# Patient Record
Sex: FEMALE | Employment: UNEMPLOYED | URBAN - NONMETROPOLITAN AREA
[De-identification: names, ages, dates, MRNs, and addresses within clinical notes are randomized per-mention and may not be internally consistent; named-entity substitution may affect disease eponyms.]

---

## 2023-10-23 DIAGNOSIS — E78.2 MIXED HYPERLIPIDEMIA: ICD-10-CM

## 2023-10-24 PROBLEM — G47.30 SLEEP APNEA IN ADULT: Status: ACTIVE | Noted: 2023-10-24

## 2023-10-24 PROBLEM — D64.9 ANEMIA: Status: ACTIVE | Noted: 2023-10-24

## 2023-10-24 PROBLEM — I47.29 NSVT (NONSUSTAINED VENTRICULAR TACHYCARDIA) (MULTI): Status: ACTIVE | Noted: 2023-10-24

## 2023-10-24 PROBLEM — I10 BENIGN ESSENTIAL HYPERTENSION: Status: ACTIVE | Noted: 2023-10-24

## 2023-10-24 PROBLEM — E11.9 DIABETES (MULTI): Status: ACTIVE | Noted: 2023-10-24

## 2023-10-24 PROBLEM — R00.2 PALPITATIONS: Status: ACTIVE | Noted: 2023-10-24

## 2023-10-24 PROBLEM — E78.5 HLD (HYPERLIPIDEMIA): Status: ACTIVE | Noted: 2023-10-24

## 2023-10-24 PROBLEM — R07.9 CHEST PAIN: Status: ACTIVE | Noted: 2023-10-24

## 2023-10-24 RX ORDER — ATORVASTATIN CALCIUM 20 MG/1
1 TABLET, FILM COATED ORAL NIGHTLY
COMMUNITY
End: 2023-10-30 | Stop reason: SDUPTHER

## 2023-10-24 RX ORDER — SPIRONOLACTONE 50 MG/1
1 TABLET, FILM COATED ORAL DAILY
COMMUNITY
End: 2023-10-30 | Stop reason: SDUPTHER

## 2023-10-24 RX ORDER — DULAGLUTIDE 0.75 MG/.5ML
0.75 INJECTION, SOLUTION SUBCUTANEOUS
COMMUNITY

## 2023-10-24 RX ORDER — ASPIRIN 81 MG/1
81 TABLET ORAL DAILY
COMMUNITY

## 2023-10-24 RX ORDER — FLUTICASONE PROPIONATE 50 MCG
2 SPRAY, SUSPENSION (ML) NASAL DAILY
COMMUNITY

## 2023-10-24 RX ORDER — ATORVASTATIN CALCIUM 20 MG/1
20 TABLET, FILM COATED ORAL NIGHTLY
Qty: 90 TABLET | Refills: 3 | OUTPATIENT
Start: 2023-10-24

## 2023-10-24 RX ORDER — IRBESARTAN 150 MG/1
1 TABLET ORAL DAILY
COMMUNITY
End: 2023-10-30 | Stop reason: SDUPTHER

## 2023-10-24 RX ORDER — CARVEDILOL 25 MG/1
1.5 TABLET ORAL 2 TIMES DAILY
COMMUNITY
End: 2023-10-30 | Stop reason: SDUPTHER

## 2023-10-24 RX ORDER — CHLORTHALIDONE 50 MG/1
1 TABLET ORAL DAILY
COMMUNITY

## 2023-10-30 ENCOUNTER — OFFICE VISIT (OUTPATIENT)
Dept: CARDIOLOGY | Facility: CLINIC | Age: 48
End: 2023-10-30
Payer: COMMERCIAL

## 2023-10-30 VITALS
WEIGHT: 293 LBS | BODY MASS INDEX: 47.09 KG/M2 | DIASTOLIC BLOOD PRESSURE: 74 MMHG | HEART RATE: 68 BPM | HEIGHT: 66 IN | SYSTOLIC BLOOD PRESSURE: 118 MMHG

## 2023-10-30 DIAGNOSIS — I10 BENIGN ESSENTIAL HYPERTENSION: Primary | ICD-10-CM

## 2023-10-30 DIAGNOSIS — E78.2 MIXED HYPERLIPIDEMIA: ICD-10-CM

## 2023-10-30 DIAGNOSIS — E11.9 TYPE 2 DIABETES MELLITUS WITHOUT COMPLICATION, WITHOUT LONG-TERM CURRENT USE OF INSULIN (MULTI): ICD-10-CM

## 2023-10-30 DIAGNOSIS — G47.30 SLEEP APNEA IN ADULT: ICD-10-CM

## 2023-10-30 PROCEDURE — 3078F DIAST BP <80 MM HG: CPT | Performed by: NURSE PRACTITIONER

## 2023-10-30 PROCEDURE — 3008F BODY MASS INDEX DOCD: CPT | Performed by: NURSE PRACTITIONER

## 2023-10-30 PROCEDURE — 4010F ACE/ARB THERAPY RXD/TAKEN: CPT | Performed by: NURSE PRACTITIONER

## 2023-10-30 PROCEDURE — 99213 OFFICE O/P EST LOW 20 MIN: CPT | Performed by: NURSE PRACTITIONER

## 2023-10-30 PROCEDURE — 1036F TOBACCO NON-USER: CPT | Performed by: NURSE PRACTITIONER

## 2023-10-30 PROCEDURE — 3074F SYST BP LT 130 MM HG: CPT | Performed by: NURSE PRACTITIONER

## 2023-10-30 RX ORDER — IBUPROFEN 800 MG/1
800 TABLET ORAL 3 TIMES DAILY
COMMUNITY
Start: 2022-11-08

## 2023-10-30 RX ORDER — ALBUTEROL SULFATE 0.83 MG/ML
2.5 SOLUTION RESPIRATORY (INHALATION) EVERY 6 HOURS
COMMUNITY

## 2023-10-30 RX ORDER — ALBUTEROL SULFATE 90 UG/1
1 AEROSOL, METERED RESPIRATORY (INHALATION) EVERY 4 HOURS PRN
COMMUNITY
Start: 2023-01-25

## 2023-10-30 RX ORDER — SPIRONOLACTONE 50 MG/1
50 TABLET, FILM COATED ORAL DAILY
Qty: 90 TABLET | Refills: 3 | Status: SHIPPED | OUTPATIENT
Start: 2023-10-30 | End: 2024-10-29

## 2023-10-30 RX ORDER — ATORVASTATIN CALCIUM 20 MG/1
20 TABLET, FILM COATED ORAL NIGHTLY
Qty: 90 TABLET | Refills: 3 | Status: SHIPPED | OUTPATIENT
Start: 2023-10-30 | End: 2023-11-07

## 2023-10-30 RX ORDER — IRBESARTAN 150 MG/1
150 TABLET ORAL DAILY
Qty: 90 TABLET | Refills: 3 | Status: SHIPPED | OUTPATIENT
Start: 2023-10-30 | End: 2024-10-29

## 2023-10-30 RX ORDER — CARVEDILOL 25 MG/1
37.5 TABLET ORAL 2 TIMES DAILY
Qty: 270 TABLET | Refills: 3 | Status: SHIPPED | OUTPATIENT
Start: 2023-10-30 | End: 2024-10-29

## 2023-10-30 RX ORDER — ACETAMINOPHEN 500 MG
1000 TABLET ORAL EVERY 6 HOURS PRN
COMMUNITY
Start: 2023-05-05

## 2023-10-30 RX ORDER — CETIRIZINE HYDROCHLORIDE 10 MG/1
10 TABLET ORAL 2 TIMES DAILY
COMMUNITY
Start: 2023-01-25

## 2023-10-30 NOTE — PATIENT INSTRUCTIONS
Please bring all medicines, vitamins, and herbal supplements with you when you come to the office.    Prescriptions will not be filled unless you are compliant with your follow up appointments or have a follow up appointment scheduled as per instruction of your physician. Refills should be requested at the time of your visit.    PLAN:   Through informed decision making process incorporating patients unique circumstances, the following treatment plan will be initiated:    1.  Prescription drug management of cardiovascular medication for efficacy, adherence to treatment, side effect assessment and polypharmacy. Current treatment clinically warranted and to continue without modifications.    2.  Annual labs (lipids, chem6)    3. Return for follow-up; in the interim, contact the office if new symptoms arise.  NP annual

## 2023-10-31 ASSESSMENT — ENCOUNTER SYMPTOMS
IRREGULAR HEARTBEAT: 0
PND: 0
DYSPNEA ON EXERTION: 0
ORTHOPNEA: 0
SYNCOPE: 0
NEAR-SYNCOPE: 0
PALPITATIONS: 0

## 2023-10-31 NOTE — PROGRESS NOTES
Chief Complaint  ' doing fine'     Reason for Visit  Routine annual follow-up  Patient presents to the office today for outpatient follow-up for hypertension  Last evaluated in clinic by myself November 2022.  Presents today ambulatory with steady gait.  Accompanied by patient  Patient denies any hospitalizations or significant changes to interval medical history since last office follow-up.     History of Present Illness   Patient is followed routinely with [facility] for hypertension management, February 2022 unremarkable echocardiogram with normal LVEF and RV function.  No prior ischemic work-up.  She presents the office today without any voiced complaints.  She continues to follow routinely with PCP and pain management.  She has been attending the lymphedema clinic with significant benefit.  Otherwise, she is trying to increase her activity level by walking on a daily basis but this has been hindered by a meniscus tear.  She follows a blood pressure regularly at home and it is optimal on current regimen.    Patient reports that overall has no complaint(s) of chest pain, dyspnea, exertional chest pressure/discomfort, fatigue, irregular heart beat, orthopnea, and tachypnea    Daily activity:   ADLs, housework.  Recently started walking program.  Denies any change in exercise capacity or functional tolerance since last office visit.    The importance of primary prevention reviewed:  HTN: Optimal in office  HLD: Treated hyperlipidemia, due for labs  DM: Unknown recent hemoglobin A1c  Smoker: Denies  BMI:  Reviewed the merits of healthy lifestyle choices on overall cardiovascular health.    She will be due for recent labs.  Discussed possibility of transferring hypertension management back over to primary care and she is very reluctant, she has been stable on this established regimen for approximately 18 months and is pleased with the response to management here.  Therefore, she will follow-up on an annual basis.    Review  "of Systems   Cardiovascular:  Negative for chest pain, dyspnea on exertion, irregular heartbeat, leg swelling, near-syncope, orthopnea, palpitations, paroxysmal nocturnal dyspnea and syncope.        Visit Vitals  /74 (BP Location: Left arm, Patient Position: Sitting)   Pulse 68   Ht 1.676 m (5' 6\")   Wt (!) 189 kg (416 lb)   BMI 67.14 kg/m²   Smoking Status Never   BSA 2.97 m²     Physical Exam  Vitals and nursing note reviewed.   Constitutional:       Appearance: She is obese.   HENT:      Head: Normocephalic.   Cardiovascular:      Rate and Rhythm: Normal rate and regular rhythm.      Heart sounds: Normal heart sounds.   Pulmonary:      Effort: Pulmonary effort is normal.      Breath sounds: Normal breath sounds.   Abdominal:      Palpations: Abdomen is soft.   Musculoskeletal:      Right lower leg: No edema.      Left lower leg: No edema.   Skin:     General: Skin is warm and dry.   Neurological:      General: No focal deficit present.      Mental Status: She is alert.   Psychiatric:         Mood and Affect: Mood normal.         Behavior: Behavior normal.            Current Outpatient Medications   Medication Instructions    acetaminophen (TYLENOL) 1,000 mg, oral, Every 6 hours PRN    albuterol (Ventolin HFA) 90 mcg/actuation inhaler 1 puff, inhalation, Every 4 hours PRN    albuterol 2.5 mg, Every 6 hours    aspirin 81 mg, oral, Daily    atorvastatin (LIPITOR) 20 mg, oral, Nightly    carvedilol (COREG) 37.5 mg, oral, 2 times daily    cetirizine (ZYRTEC) 10 mg, oral, 2 times daily    chlorthalidone (Hygroton) 50 mg tablet 1 tablet, oral, Daily    empagliflozin (Jardiance) 10 mg 1 tablet, oral, Daily    fluticasone (Flonase Allergy Relief) 50 mcg/actuation nasal spray 2 sprays, Each Nostril, Daily    ibuprofen 800 mg, oral, 3 times daily    irbesartan (AVAPRO) 150 mg, oral, Daily    spironolactone (ALDACTONE) 50 mg, oral, Daily    Trulicity 0.75 mg, subcutaneous, Weekly    vit c-ascorbate Ca-ascorb sod 500 " mg/15 mL liquid oral, As needed      Assessment:    Sleep apnea in adult  Intolerant to CPAP    Benign essential hypertension  optimal in office      HLD (hyperlipidemia)  Low intensity statin     Diabetes (CMS/HCC)  On ARB/statin    BMI 60.0-69.9, adult (CMS/HCC)  Reviewed the merits of healthy lifestyle choices on overall cardiovascular health.     Plan:   Current treatment plan is effective, no change in therapy.  Repeat labs ordered prior to next appointment.  Reviewed diet, exercise and weight control.  Recommended sodium restriction.    Annual labs of Chem-6 and lipids.  Annual follow-up or as needed for symptom management.    Teri Jaramillo  MSN, APRN-CNP, PMHNP-BC  [facility]    Please excuse any errors in grammar or translation related to this dictation. Voice recognition software was utilized to prepare this document.

## 2023-11-06 DIAGNOSIS — I10 BENIGN ESSENTIAL HYPERTENSION: ICD-10-CM

## 2023-11-06 DIAGNOSIS — G47.30 SLEEP APNEA IN ADULT: ICD-10-CM

## 2023-11-06 DIAGNOSIS — E11.9 TYPE 2 DIABETES MELLITUS WITHOUT COMPLICATION, WITHOUT LONG-TERM CURRENT USE OF INSULIN (MULTI): ICD-10-CM

## 2023-11-06 DIAGNOSIS — E78.2 MIXED HYPERLIPIDEMIA: ICD-10-CM

## 2023-11-07 RX ORDER — ATORVASTATIN CALCIUM 20 MG/1
20 TABLET, FILM COATED ORAL NIGHTLY
Qty: 90 TABLET | Refills: 3 | Status: SHIPPED | OUTPATIENT
Start: 2023-11-07

## 2023-11-10 DIAGNOSIS — I10 BENIGN ESSENTIAL HYPERTENSION: ICD-10-CM

## 2023-11-10 DIAGNOSIS — E78.2 MIXED HYPERLIPIDEMIA: ICD-10-CM

## 2023-11-10 DIAGNOSIS — G47.30 SLEEP APNEA IN ADULT: ICD-10-CM

## 2023-11-10 DIAGNOSIS — E11.9 TYPE 2 DIABETES MELLITUS WITHOUT COMPLICATION, WITHOUT LONG-TERM CURRENT USE OF INSULIN (MULTI): ICD-10-CM

## 2023-11-13 RX ORDER — CARVEDILOL 25 MG/1
37.5 TABLET ORAL 2 TIMES DAILY
Qty: 90 TABLET | Refills: 10 | OUTPATIENT
Start: 2023-11-13

## 2023-11-13 RX ORDER — IRBESARTAN 150 MG/1
150 TABLET ORAL DAILY
Qty: 30 TABLET | Refills: 10 | OUTPATIENT
Start: 2023-11-13

## 2023-11-13 RX ORDER — SPIRONOLACTONE 50 MG/1
TABLET, FILM COATED ORAL
Qty: 30 TABLET | Refills: 10 | OUTPATIENT
Start: 2023-11-13

## 2024-02-29 ENCOUNTER — TELEPHONE (OUTPATIENT)
Dept: CARDIOLOGY | Facility: CLINIC | Age: 49
End: 2024-02-29
Payer: MEDICARE

## 2024-02-29 DIAGNOSIS — G47.30 SLEEP APNEA IN ADULT: ICD-10-CM

## 2024-02-29 DIAGNOSIS — R00.2 PALPITATIONS: ICD-10-CM

## 2024-02-29 NOTE — TELEPHONE ENCOUNTER
Patient states just getting over pneumonia and still feels like something is off with her heart. No complaints of chest pain .She just would like to be seen sooner than October. To make sure the pneumonia didn't make anything worse with her heart     Please advise

## 2024-03-18 ENCOUNTER — ANCILLARY PROCEDURE (OUTPATIENT)
Dept: CARDIOLOGY | Facility: CLINIC | Age: 49
End: 2024-03-18
Payer: MEDICARE

## 2024-03-18 VITALS
HEART RATE: 82 BPM | HEIGHT: 66 IN | BODY MASS INDEX: 67.14 KG/M2 | SYSTOLIC BLOOD PRESSURE: 150 MMHG | DIASTOLIC BLOOD PRESSURE: 78 MMHG

## 2024-03-18 DIAGNOSIS — R00.2 PALPITATIONS: ICD-10-CM

## 2024-03-18 PROCEDURE — 93000 ELECTROCARDIOGRAM COMPLETE: CPT | Performed by: INTERNAL MEDICINE

## 2024-03-18 NOTE — PROGRESS NOTES
"Patient here for at EKG visit ordered by Dr. Dinero due to not feeling well . Dr. Dinero in suite physician. Patient here due to EKG order . Medication list Updated verbally.no cardiac complaints. Discussed with KENNY Huerta RN  prior to discharge.     To Dr. Dinero for review        Vitals:    03/18/24 1420   BP: 150/78   BP Location: Left arm   Patient Position: Sitting   Pulse: 82   Height: 1.676 m (5' 6\")        "

## 2024-03-25 ENCOUNTER — OFFICE VISIT (OUTPATIENT)
Dept: CARDIOLOGY | Facility: CLINIC | Age: 49
End: 2024-03-25
Payer: COMMERCIAL

## 2024-03-25 VITALS
WEIGHT: 293 LBS | HEART RATE: 80 BPM | BODY MASS INDEX: 47.09 KG/M2 | SYSTOLIC BLOOD PRESSURE: 148 MMHG | DIASTOLIC BLOOD PRESSURE: 72 MMHG | HEIGHT: 66 IN

## 2024-03-25 DIAGNOSIS — I10 BENIGN ESSENTIAL HYPERTENSION: Primary | ICD-10-CM

## 2024-03-25 PROCEDURE — 3077F SYST BP >= 140 MM HG: CPT | Performed by: NURSE PRACTITIONER

## 2024-03-25 PROCEDURE — 3008F BODY MASS INDEX DOCD: CPT | Performed by: NURSE PRACTITIONER

## 2024-03-25 PROCEDURE — 3078F DIAST BP <80 MM HG: CPT | Performed by: NURSE PRACTITIONER

## 2024-03-25 PROCEDURE — 1036F TOBACCO NON-USER: CPT | Performed by: NURSE PRACTITIONER

## 2024-03-25 PROCEDURE — 99212 OFFICE O/P EST SF 10 MIN: CPT | Performed by: NURSE PRACTITIONER

## 2024-03-25 PROCEDURE — 4010F ACE/ARB THERAPY RXD/TAKEN: CPT | Performed by: NURSE PRACTITIONER

## 2024-03-25 ASSESSMENT — ENCOUNTER SYMPTOMS
IRREGULAR HEARTBEAT: 0
DYSPNEA ON EXERTION: 0
PALPITATIONS: 0
PND: 0
ORTHOPNEA: 0
SYNCOPE: 0
NEAR-SYNCOPE: 0

## 2024-03-25 NOTE — PATIENT INSTRUCTIONS
Please bring all medicines, vitamins, and herbal supplements with you when you come to the office.    Prescriptions will not be filled unless you are compliant with your follow up appointments or have a follow up appointment scheduled as per instruction of your physician. Refills should be requested at the time of your visit.     PLAN:   Through informed decision making process incorporating patients unique circumstances, the following treatment plan will be initiated:    1.  Prescription drug management of cardiovascular medication for efficacy, adherence to treatment, side effect assessment and polypharmacy. Current treatment clinically warranted and to continue without modifications.    2. Return for follow-up; in the interim, contact the office if new symptoms arise.  NP 6 months

## 2024-03-25 NOTE — PROGRESS NOTES
"Chief Complaint  \"I am doing better\"     Reason for Visit  Add-on  Patient presents to the office today for outpatient follow-up for complaints of 'heart fluttering'  Last evaluated in clinic by myself Oct 2023.    Presents today ambulatory with steady gait.  Accompanied by patient  Patient denies any hospitalizations or significant changes to interval medical history since last office follow-up.     History of Present Illness   Patient is a pleasant 48-year-old female who presents to the office today reporting a February 2020 for diagnosis of pneumonia, seen in the emergency department and records review BNP 18.  She was treated with antibiotics and steroids.  She had called into the office earlier last month reporting episodes of\" heart fluttering\", a subsequent EKG revealed normal sinus rhythm.  She presents to the office today where she reports things are\" finally back to normal\" and she has had no recurrent episodes of palpitations over at least 2 to 3 weeks.    She has noticed increased lower extremity edema, has a history of lymphedema and will be returning back to the lymphedema clinic next week.  Otherwise, has not been able to tolerate compression stockings.    She reports that she has returned to her usual state of health.  Denies any dyspnea on exertion, no orthopnea or PND.  Lower extremity edema had previously been optimal with lymphedema treatment.  Otherwise, blood pressure is okay here in the office and she continues to follow regularly at home.  Remains compliant with medications.    Patient reports that overall has no complaint(s) of chest pain, chest pressure/discomfort, dyspnea, fatigue, and irregular heart beat       Denies any change in exercise capacity or functional tolerance since last office visit.    Review of Systems   Cardiovascular:  Negative for chest pain, dyspnea on exertion, irregular heartbeat, leg swelling, near-syncope, orthopnea, palpitations, paroxysmal nocturnal dyspnea and " "syncope.        Visit Vitals  /72 (BP Location: Left arm, Patient Position: Sitting)   Pulse 80   Ht 1.676 m (5' 6\")   Wt (!) 191 kg (420 lb)   BMI 67.79 kg/m²   Smoking Status Never   BSA 2.98 m²     Physical Exam  Vitals and nursing note reviewed.   Constitutional:       Appearance: She is obese.   HENT:      Head: Normocephalic.   Cardiovascular:      Rate and Rhythm: Normal rate and regular rhythm.      Heart sounds: Normal heart sounds.   Pulmonary:      Effort: Pulmonary effort is normal.      Breath sounds: Normal breath sounds.   Abdominal:      Palpations: Abdomen is soft.   Musculoskeletal:      Right lower leg: Edema present.      Left lower leg: Edema present.   Skin:     General: Skin is warm and dry.   Neurological:      General: No focal deficit present.      Mental Status: She is alert.   Psychiatric:         Mood and Affect: Mood normal.         Behavior: Behavior normal.        Allergies   Allergen Reactions    Clarithromycin Nausea Only and Rash       Current Outpatient Medications   Medication Instructions    acetaminophen (TYLENOL) 1,000 mg, oral, Every 6 hours PRN    albuterol (Ventolin HFA) 90 mcg/actuation inhaler 1 puff, inhalation, Every 4 hours PRN    albuterol 2.5 mg, Every 6 hours    aspirin 81 mg, oral, Daily    atorvastatin (LIPITOR) 20 mg, oral, Nightly    carvedilol (COREG) 37.5 mg, oral, 2 times daily    cetirizine (ZYRTEC) 10 mg, oral, 2 times daily    chlorthalidone (Hygroton) 50 mg tablet 1 tablet, oral, Daily    empagliflozin (Jardiance) 10 mg 1 tablet, oral, Daily    fluticasone (Flonase Allergy Relief) 50 mcg/actuation nasal spray 2 sprays, Each Nostril, Daily    ibuprofen 800 mg, oral, 3 times daily    irbesartan (AVAPRO) 150 mg, oral, Daily    spironolactone (ALDACTONE) 50 mg, oral, Daily    Trulicity 0.75 mg, subcutaneous, Weekly    vit c-ascorbate Ca-ascorb sod 500 mg/15 mL liquid oral, As needed      Assessment:    A 48-year-old female presents to the office today " reporting transient episode of palpitations that occurred during treatment and recovery for pneumonia.  She subsequently has returned to her usual state of health.  Prior cardiac workup includes normal echocardiogram, no prior ischemic workup.    Problem List Items Addressed This Visit       Benign essential hypertension - Primary    Relevant Orders    Follow Up In Cardiology    BMI 60.0-69.9, adult (CMS/[facility])        Plan:     Through informed decision making process incorporating patients unique circumstances, the following treatment plan will be initiated:    1.  Prescription drug management of cardiovascular medication for efficacy, adherence to treatment, side effect assessment and polypharmacy. Current treatment clinically warranted and to continue without modifications.    2. Return for follow-up; in the interim, contact the office if new symptoms arise.  NP 6 months    Teri Jaramillo  MSN, APRN-CNP, PMHNP-BC  [facility]    Please excuse any errors in grammar or translation related to this dictation. Voice recognition software was utilized to prepare this document.

## 2024-03-25 NOTE — LETTER
"March 25, 2024     Anais Arroyo, APRN-CNP  [address] [place] [address]    Patient: Sanjuana Angel   YOB: 1975   Date of Visit: 3/25/2024       Dear Dr. Anais Arroyo, APRN-CNP:    Thank you for referring Sanjuana Angel to me for evaluation. Below are my notes for this consultation.  If you have questions, please do not hesitate to call me. I look forward to following your patient along with you.       Sincerely,     Teri Jaramillo APRN-CNP      CC: No Recipients  ______________________________________________________________________________________    Chief Complaint  \"I am doing better\"     Reason for Visit  Add-on  Patient presents to the office today for outpatient follow-up for complaints of 'heart fluttering'  Last evaluated in clinic by myself Oct 2023.    Presents today ambulatory with steady gait.  Accompanied by patient  Patient denies any hospitalizations or significant changes to interval medical history since last office follow-up.     History of Present Illness   Patient is a pleasant 48-year-old female who presents to the office today reporting a February 2020 for diagnosis of pneumonia, seen in the emergency department and records review BNP 18.  She was treated with antibiotics and steroids.  She had called into the office earlier last month reporting episodes of\" heart fluttering\", a subsequent EKG revealed normal sinus rhythm.  She presents to the office today where she reports things are\" finally back to normal\" and she has had no recurrent episodes of palpitations over at least 2 to 3 weeks.    She has noticed increased lower extremity edema, has a history of lymphedema and will be returning back to the lymphedema clinic next week.  Otherwise, has not been able to tolerate compression stockings.    She reports that she has returned to her usual state of health.  Denies any dyspnea on exertion, no orthopnea or PND.  Lower extremity edema had previously been optimal with " "lymphedema treatment.  Otherwise, blood pressure is okay here in the office and she continues to follow regularly at home.  Remains compliant with medications.    Patient reports that overall has no complaint(s) of chest pain, chest pressure/discomfort, dyspnea, fatigue, and irregular heart beat       Denies any change in exercise capacity or functional tolerance since last office visit.    Review of Systems   Cardiovascular:  Negative for chest pain, dyspnea on exertion, irregular heartbeat, leg swelling, near-syncope, orthopnea, palpitations, paroxysmal nocturnal dyspnea and syncope.        Visit Vitals  /72 (BP Location: Left arm, Patient Position: Sitting)   Pulse 80   Ht 1.676 m (5' 6\")   Wt (!) 191 kg (420 lb)   BMI 67.79 kg/m²   Smoking Status Never   BSA 2.98 m²     Physical Exam  Vitals and nursing note reviewed.   Constitutional:       Appearance: She is obese.   HENT:      Head: Normocephalic.   Cardiovascular:      Rate and Rhythm: Normal rate and regular rhythm.      Heart sounds: Normal heart sounds.   Pulmonary:      Effort: Pulmonary effort is normal.      Breath sounds: Normal breath sounds.   Abdominal:      Palpations: Abdomen is soft.   Musculoskeletal:      Right lower leg: Edema present.      Left lower leg: Edema present.   Skin:     General: Skin is warm and dry.   Neurological:      General: No focal deficit present.      Mental Status: She is alert.   Psychiatric:         Mood and Affect: Mood normal.         Behavior: Behavior normal.        Allergies   Allergen Reactions   • Clarithromycin Nausea Only and Rash       Current Outpatient Medications   Medication Instructions   • acetaminophen (TYLENOL) 1,000 mg, oral, Every 6 hours PRN   • albuterol (Ventolin HFA) 90 mcg/actuation inhaler 1 puff, inhalation, Every 4 hours PRN   • albuterol 2.5 mg, Every 6 hours   • aspirin 81 mg, oral, Daily   • atorvastatin (LIPITOR) 20 mg, oral, Nightly   • carvedilol (COREG) 37.5 mg, oral, 2 times " daily   • cetirizine (ZYRTEC) 10 mg, oral, 2 times daily   • chlorthalidone (Hygroton) 50 mg tablet 1 tablet, oral, Daily   • empagliflozin (Jardiance) 10 mg 1 tablet, oral, Daily   • fluticasone (Flonase Allergy Relief) 50 mcg/actuation nasal spray 2 sprays, Each Nostril, Daily   • ibuprofen 800 mg, oral, 3 times daily   • irbesartan (AVAPRO) 150 mg, oral, Daily   • spironolactone (ALDACTONE) 50 mg, oral, Daily   • Trulicity 0.75 mg, subcutaneous, Weekly   • vit c-ascorbate Ca-ascorb sod 500 mg/15 mL liquid oral, As needed      Assessment:    A 48-year-old female presents to the office today reporting transient episode of palpitations that occurred during treatment and recovery for pneumonia.  She subsequently has returned to her usual state of health.  Prior cardiac workup includes normal echocardiogram, no prior ischemic workup.    Problem List Items Addressed This Visit       Benign essential hypertension - Primary    Relevant Orders    Follow Up In Cardiology    BMI 60.0-69.9, adult (CMS/[facility])        Plan:     Through informed decision making process incorporating patients unique circumstances, the following treatment plan will be initiated:    1.  Prescription drug management of cardiovascular medication for efficacy, adherence to treatment, side effect assessment and polypharmacy. Current treatment clinically warranted and to continue without modifications.    2. Return for follow-up; in the interim, contact the office if new symptoms arise.  NP 6 months    Teri Jaramillo  MSN, APRN-CNP, PMHNP-BC  [facility]    Please excuse any errors in grammar or translation related to this dictation. Voice recognition software was utilized to prepare this document.

## 2024-10-01 ENCOUNTER — APPOINTMENT (OUTPATIENT)
Dept: CARDIOLOGY | Facility: CLINIC | Age: 49
End: 2024-10-01
Payer: MEDICARE

## 2024-10-24 DIAGNOSIS — E11.9 TYPE 2 DIABETES MELLITUS WITHOUT COMPLICATION, WITHOUT LONG-TERM CURRENT USE OF INSULIN (MULTI): ICD-10-CM

## 2024-10-24 DIAGNOSIS — E78.2 MIXED HYPERLIPIDEMIA: ICD-10-CM

## 2024-10-24 DIAGNOSIS — I10 BENIGN ESSENTIAL HYPERTENSION: ICD-10-CM

## 2024-10-24 DIAGNOSIS — G47.30 SLEEP APNEA IN ADULT: ICD-10-CM

## 2024-10-25 RX ORDER — SPIRONOLACTONE 50 MG/1
50 TABLET, FILM COATED ORAL DAILY
Qty: 90 TABLET | Refills: 0 | Status: SHIPPED | OUTPATIENT
Start: 2024-10-25 | End: 2025-10-25

## 2024-10-25 RX ORDER — CARVEDILOL 25 MG/1
37.5 TABLET ORAL 2 TIMES DAILY
Qty: 270 TABLET | Refills: 0 | Status: SHIPPED | OUTPATIENT
Start: 2024-10-25 | End: 2025-10-25

## 2024-10-25 RX ORDER — IRBESARTAN 150 MG/1
150 TABLET ORAL DAILY
Qty: 90 TABLET | Refills: 0 | Status: SHIPPED | OUTPATIENT
Start: 2024-10-25 | End: 2025-10-25

## 2024-10-25 RX ORDER — ATORVASTATIN CALCIUM 20 MG/1
20 TABLET, FILM COATED ORAL NIGHTLY
Qty: 90 TABLET | Refills: 0 | Status: SHIPPED | OUTPATIENT
Start: 2024-10-25 | End: 2025-10-25

## 2024-10-29 ENCOUNTER — APPOINTMENT (OUTPATIENT)
Dept: CARDIOLOGY | Facility: CLINIC | Age: 49
End: 2024-10-29
Payer: MEDICARE

## 2024-11-14 ENCOUNTER — APPOINTMENT (OUTPATIENT)
Dept: CARDIOLOGY | Facility: CLINIC | Age: 49
End: 2024-11-14
Payer: COMMERCIAL

## 2024-11-14 VITALS
WEIGHT: 293 LBS | BODY MASS INDEX: 47.09 KG/M2 | SYSTOLIC BLOOD PRESSURE: 144 MMHG | HEART RATE: 68 BPM | DIASTOLIC BLOOD PRESSURE: 92 MMHG | HEIGHT: 66 IN

## 2024-11-14 DIAGNOSIS — E11.9 TYPE 2 DIABETES MELLITUS WITHOUT COMPLICATION, WITHOUT LONG-TERM CURRENT USE OF INSULIN (MULTI): ICD-10-CM

## 2024-11-14 DIAGNOSIS — G47.30 SLEEP APNEA IN ADULT: ICD-10-CM

## 2024-11-14 DIAGNOSIS — I10 BENIGN ESSENTIAL HYPERTENSION: ICD-10-CM

## 2024-11-14 DIAGNOSIS — Z78.9 NEVER SMOKED CIGARETTES: ICD-10-CM

## 2024-11-14 DIAGNOSIS — Z76.89 ESTABLISHING CARE WITH NEW DOCTOR, ENCOUNTER FOR: ICD-10-CM

## 2024-11-14 DIAGNOSIS — E78.2 MIXED HYPERLIPIDEMIA: ICD-10-CM

## 2024-11-14 DIAGNOSIS — Z79.899 MEDICATION COURSE CHANGED: ICD-10-CM

## 2024-11-14 DIAGNOSIS — I10 UNCONTROLLED HYPERTENSION: Primary | ICD-10-CM

## 2024-11-14 PROCEDURE — 3077F SYST BP >= 140 MM HG: CPT | Performed by: INTERNAL MEDICINE

## 2024-11-14 PROCEDURE — 4010F ACE/ARB THERAPY RXD/TAKEN: CPT | Performed by: INTERNAL MEDICINE

## 2024-11-14 PROCEDURE — 3008F BODY MASS INDEX DOCD: CPT | Performed by: INTERNAL MEDICINE

## 2024-11-14 PROCEDURE — 99214 OFFICE O/P EST MOD 30 MIN: CPT | Performed by: INTERNAL MEDICINE

## 2024-11-14 PROCEDURE — 1036F TOBACCO NON-USER: CPT | Performed by: INTERNAL MEDICINE

## 2024-11-14 PROCEDURE — G2211 COMPLEX E/M VISIT ADD ON: HCPCS | Performed by: INTERNAL MEDICINE

## 2024-11-14 PROCEDURE — 3080F DIAST BP >= 90 MM HG: CPT | Performed by: INTERNAL MEDICINE

## 2024-11-14 RX ORDER — IRBESARTAN 300 MG/1
300 TABLET ORAL NIGHTLY
Qty: 90 TABLET | Refills: 3 | Status: SHIPPED | OUTPATIENT
Start: 2024-11-14 | End: 2025-11-14

## 2024-11-14 RX ORDER — SPIRONOLACTONE 25 MG/1
50 TABLET ORAL 2 TIMES DAILY
Qty: 360 TABLET | Refills: 3 | Status: SHIPPED | OUTPATIENT
Start: 2024-11-14 | End: 2025-11-14

## 2024-11-14 RX ORDER — TIRZEPATIDE 2.5 MG/.5ML
2.5 INJECTION, SOLUTION SUBCUTANEOUS WEEKLY
COMMUNITY
Start: 2024-07-16

## 2024-11-14 NOTE — LETTER
November 14, 2024     Anais Arroyo, APRN-[facility]  [address] [place] [address]    Patient: Sanjuana Angel   YOB: 1975   Date of Visit: 11/14/2024       Dear Dr. Anais Aroryo, APRN-CNP:    Thank you for referring Sanjuana Angel to me for evaluation. Below are my notes for this consultation.  If you have questions, please do not hesitate to call me. I look forward to following your patient along with you.       Sincerely,     Lisa Martines MD      CC: No Recipients  ______________________________________________________________________________________      Patient is new to this provider.  She is 49 years old.  She has had several visits with Teri Jaramillo NP, and I have reviewed her notes.  ER visit in July 2024 for shortness of breath.  Blood pressure in the ER was 204/190 initially with oxygen saturation of 99% BNP level then was 34 patient was treated with IV labetalol and IV hydralazine.  Subjective :     Chronic exertional shortness of breath functional class III multifactorial in my assessment BMI greater than 65.  Blood pressure suboptimally controlled  Complaints of fluid retention and lower extremity edema in addition to the chronic lymphedema.  Does not report consumption of salty foods  Reports compliance with medications  Not wearing CPAP on a regular basis  Denies chest pressure tightness or heaviness  On Mounjaro at low-dose  History so Far :  1.  Essential hypertension  2.  Type 2 diabetes without long-term use of insulin  3.  Mixed hyperlipidemia  4.  Sleep apnea, without regular CPAP use  5.  Weight as of 11/14/2024 is 420 pounds  6.  BNP 18 in 2023  7.  Status post cholecystectomy  8.  Never a smoker    Objective   Wt Readings from Last 3 Encounters:   11/14/24 (!) 195 kg (430 lb)   03/25/24 (!) 191 kg (420 lb)   10/30/23 (!) 189 kg (416 lb)            Vitals:    11/14/24 1239   BP: (!) 144/92   BP Location: Left arm   Patient Position: Sitting   Pulse: 68   Weight: (!) 195 kg  "(430 lb)   Height: 1.676 m (5' 6\")                Physical Exam:    GENERAL APPEARANCE: in no acute distress.  CHEST: Symmetric and non-tender.  INTEGUMENT: Skin warm and dry  HEENT: No gross abnormalities identified.No pallor or scleral icterus.  NECK: Supple, no JVD, no bruit.   NEURO/PSHCY: Alert and oriented x3; appropriate behavior and responses and responses  LUNGS: Clear to auscultation bilaterally; normal respiratory effort.  HEART: Rate and rhythm regular with no evident murmur; no gallop appreciated.   ABDOMEN: Soft, non tender.  MUSCULOSKELETAL: No gross deformities.  EXTREMITIES: Warm  There is minimal pitting edema noted.    Meds:  Current Outpatient Medications   Medication Instructions   • acetaminophen (TYLENOL) 1,000 mg, Every 6 hours PRN   • albuterol (Ventolin HFA) 90 mcg/actuation inhaler 1 puff, Every 4 hours PRN   • albuterol 2.5 mg, Every 6 hours   • atorvastatin (LIPITOR) 20 mg, oral, Nightly   • carvedilol (COREG) 37.5 mg, oral, 2 times daily   • cetirizine (ZYRTEC) 10 mg, 2 times daily   • empagliflozin (Jardiance) 10 mg 1 tablet, Daily   • fluticasone (Flonase Allergy Relief) 50 mcg/actuation nasal spray 2 sprays, Daily   • ibuprofen 800 mg, Every 8 hours PRN   • irbesartan (AVAPRO) 300 mg, oral, Nightly   • Mounjaro 2.5 mg, Weekly   • spironolactone (ALDACTONE) 50 mg, oral, 2 times daily          Allergies   Allergen Reactions   • Clarithromycin Nausea Only and Rash             LABS:    Lab Results   Component Value Date     03/04/2020    K 4.0 03/04/2020     03/04/2020    CREATININE 0.62 03/04/2020    BUN 13 03/04/2020    CO2 31 03/04/2020    HGBA1C 6.5 (H) 02/08/2023 11/11/2024-hemoglobin 10.8 hematocrit 34.9 MCV 67.6 platelet count 2 95,000 sodium 138 potassium 3.7 BUN 12 creatinine 0.95 GFR greater than 60      Patient Active Problem List    Diagnosis Date Noted   • Never smoked cigarettes 11/14/2024   • Medication course changed 11/14/2024   • Establishing " care with new doctor, encounter for 11/14/2024   • BMI 60.0-69.9, adult (Multi) 10/30/2023   • Anemia 10/24/2023   • Uncontrolled hypertension 10/24/2023   • Chest pain 10/24/2023   • Diabetes (Multi) 10/24/2023   • HLD (hyperlipidemia) 10/24/2023   • NSVT (nonsustained ventricular tachycardia) (Multi) 10/24/2023   • Palpitations 10/24/2023   • Sleep apnea in adult 10/24/2023                 Assessment:    1. Uncontrolled hypertension        2. Type 2 diabetes mellitus without complication, without long-term current use of insulin (Multi)  Follow Up In Cardiology      3. Benign essential hypertension  Follow Up In Cardiology    Follow Up In Cardiology    irbesartan (Avapro) 300 mg tablet    spironolactone (Aldactone) 25 mg tablet    Basic Metabolic Panel    Comprehensive Metabolic Panel    Basic Metabolic Panel    Comprehensive Metabolic Panel      4. Mixed hyperlipidemia  Follow Up In Cardiology    Lipid Panel    Lipid Panel      5. Sleep apnea in adult        6. BMI 60.0-69.9, adult (Multi)        7. Never smoked cigarettes        8. Medication course changed        9. Establishing care with new doctor, encounter for           Clinical decision making:  This is a new patient to me  Her weight seems to be her major comorbidity.  She is currently on Mounjaro, would consider up titration of dose.  Talked about bariatric surgical option, she is not ready  Talked about using CPAP to reduce right heart strain  To further improve her blood pressure management, we will increase her Avapro from 150 mg daily to 300 mg daily she does have hypokalemia, therefore we will increase her Aldactone to 50 mg twice a day  Basic metabolic profile 1 week after medication change  Regular follow-up with primary MD  Comprehensive profile lipid profile prior to next visit  Follow up : 6 months        Provider Attestation - Meche BOCANEGRA LPN Scribe documentation    All medical record entries made by the Scribe were at my direction and  personally dictated by me. I have reviewed the chart and agree that the record accurately reflects my personal performance of the history, physical exam, discussion and plan.

## 2024-11-14 NOTE — PROGRESS NOTES
"  Patient is new to this provider.  She is 49 years old.  She has had several visits with Teri Jaramillo NP, and I have reviewed her notes.  ER visit in July 2024 for shortness of breath.  Blood pressure in the ER was 204/190 initially with oxygen saturation of 99% BNP level then was 34 patient was treated with IV labetalol and IV hydralazine.  Subjective :     Chronic exertional shortness of breath functional class III multifactorial in my assessment BMI greater than 65.  Blood pressure suboptimally controlled  Complaints of fluid retention and lower extremity edema in addition to the chronic lymphedema.  Does not report consumption of salty foods  Reports compliance with medications  Not wearing CPAP on a regular basis  Denies chest pressure tightness or heaviness  On Mounjaro at low-dose  History so Far :  1.  Essential hypertension  2.  Type 2 diabetes without long-term use of insulin  3.  Mixed hyperlipidemia  4.  Sleep apnea, without regular CPAP use  5.  Weight as of 11/14/2024 is 420 pounds  6.  BNP 18 in 2023  7.  Status post cholecystectomy  8.  Never a smoker    Objective   Wt Readings from Last 3 Encounters:   11/14/24 (!) 195 kg (430 lb)   03/25/24 (!) 191 kg (420 lb)   10/30/23 (!) 189 kg (416 lb)            Vitals:    11/14/24 1239   BP: (!) 144/92   BP Location: Left arm   Patient Position: Sitting   Pulse: 68   Weight: (!) 195 kg (430 lb)   Height: 1.676 m (5' 6\")                Physical Exam:    GENERAL APPEARANCE: in no acute distress.  CHEST: Symmetric and non-tender.  INTEGUMENT: Skin warm and dry  HEENT: No gross abnormalities identified.No pallor or scleral icterus.  NECK: Supple, no JVD, no bruit.   NEURO/PSHCY: Alert and oriented x3; appropriate behavior and responses and responses  LUNGS: Clear to auscultation bilaterally; normal respiratory effort.  HEART: Rate and rhythm regular with no evident murmur; no gallop appreciated.   ABDOMEN: Soft, non tender.  MUSCULOSKELETAL: No gross " deformities.  EXTREMITIES: Warm  There is minimal pitting edema noted.    Meds:  Current Outpatient Medications   Medication Instructions    acetaminophen (TYLENOL) 1,000 mg, Every 6 hours PRN    albuterol (Ventolin HFA) 90 mcg/actuation inhaler 1 puff, Every 4 hours PRN    albuterol 2.5 mg, Every 6 hours    atorvastatin (LIPITOR) 20 mg, oral, Nightly    carvedilol (COREG) 37.5 mg, oral, 2 times daily    cetirizine (ZYRTEC) 10 mg, 2 times daily    empagliflozin (Jardiance) 10 mg 1 tablet, Daily    fluticasone (Flonase Allergy Relief) 50 mcg/actuation nasal spray 2 sprays, Daily    ibuprofen 800 mg, Every 8 hours PRN    irbesartan (AVAPRO) 300 mg, oral, Nightly    Mounjaro 2.5 mg, Weekly    spironolactone (ALDACTONE) 50 mg, oral, 2 times daily          Allergies   Allergen Reactions    Clarithromycin Nausea Only and Rash             LABS:    Lab Results   Component Value Date     03/04/2020    K 4.0 03/04/2020     03/04/2020    CREATININE 0.62 03/04/2020    BUN 13 03/04/2020    CO2 31 03/04/2020    HGBA1C 6.5 (H) 02/08/2023 11/11/2024-hemoglobin 10.8 hematocrit 34.9 MCV 67.6 platelet count 2 95,000 sodium 138 potassium 3.7 BUN 12 creatinine 0.95 GFR greater than 60      Patient Active Problem List    Diagnosis Date Noted    Never smoked cigarettes 11/14/2024    Medication course changed 11/14/2024    Establishing care with new doctor, encounter for 11/14/2024    BMI 60.0-69.9, adult (Multi) 10/30/2023    Anemia 10/24/2023    Uncontrolled hypertension 10/24/2023    Chest pain 10/24/2023    Diabetes (Multi) 10/24/2023    HLD (hyperlipidemia) 10/24/2023    NSVT (nonsustained ventricular tachycardia) (Multi) 10/24/2023    Palpitations 10/24/2023    Sleep apnea in adult 10/24/2023                 Assessment:    1. Uncontrolled hypertension        2. Type 2 diabetes mellitus without complication, without long-term current use of insulin (Multi)  Follow Up In Cardiology      3. Benign essential  hypertension  Follow Up In Cardiology    Follow Up In Cardiology    irbesartan (Avapro) 300 mg tablet    spironolactone (Aldactone) 25 mg tablet    Basic Metabolic Panel    Comprehensive Metabolic Panel    Basic Metabolic Panel    Comprehensive Metabolic Panel      4. Mixed hyperlipidemia  Follow Up In Cardiology    Lipid Panel    Lipid Panel      5. Sleep apnea in adult        6. BMI 60.0-69.9, adult (Multi)        7. Never smoked cigarettes        8. Medication course changed        9. Establishing care with new doctor, encounter for           Clinical decision making:  This is a new patient to me  Her weight seems to be her major comorbidity.  She is currently on Mounjaro, would consider up titration of dose.  Talked about bariatric surgical option, she is not ready  Talked about using CPAP to reduce right heart strain  To further improve her blood pressure management, we will increase her Avapro from 150 mg daily to 300 mg daily she does have hypokalemia, therefore we will increase her Aldactone to 50 mg twice a day  Basic metabolic profile 1 week after medication change  Regular follow-up with primary MD  Comprehensive profile lipid profile prior to next visit  Follow up : 6 months        Provider Attestation - Meche BOCANEGRA LPN Scribe documentation    All medical record entries made by the Scribe were at my direction and personally dictated by me. I have reviewed the chart and agree that the record accurately reflects my personal performance of the history, physical exam, discussion and plan.

## 2024-11-21 DIAGNOSIS — I10 UNCONTROLLED HYPERTENSION: Primary | ICD-10-CM

## 2024-11-22 RX ORDER — SPIRONOLACTONE 50 MG/1
50 TABLET, FILM COATED ORAL DAILY
Qty: 30 TABLET | Refills: 10 | Status: SHIPPED | OUTPATIENT
Start: 2024-11-22

## 2024-11-22 RX ORDER — IRBESARTAN 150 MG/1
150 TABLET ORAL DAILY
Qty: 30 TABLET | Refills: 10 | Status: SHIPPED | OUTPATIENT
Start: 2024-11-22

## 2025-01-09 DIAGNOSIS — I10 UNCONTROLLED HYPERTENSION: ICD-10-CM

## 2025-01-09 DIAGNOSIS — I10 BENIGN ESSENTIAL HYPERTENSION: ICD-10-CM

## 2025-01-09 RX ORDER — IRBESARTAN 300 MG/1
300 TABLET ORAL NIGHTLY
Qty: 90 TABLET | Refills: 1 | Status: SHIPPED | OUTPATIENT
Start: 2025-01-09

## 2025-01-09 NOTE — TELEPHONE ENCOUNTER
Patient request refill to [facility] for avapro and aldactone.  has been without rx for 3 days. May visit pending.    Patient  is taking Avapro 300 mg daily.    Last visit with Dr. Martines dictation reviewed and states Avapro will be increased to 300 mg daily and Aldactone to 50 mg BID. Medication list does not reflect this and both doses of avapro remain on chart as well as aldactone.    What does of Aldactone do you want to fill?  is taking 50 mg daily

## 2025-01-10 RX ORDER — SPIRONOLACTONE 50 MG/1
50 TABLET, FILM COATED ORAL 2 TIMES DAILY
Qty: 60 TABLET | Refills: 0 | Status: SHIPPED | OUTPATIENT
Start: 2025-01-10 | End: 2026-01-10

## 2025-01-10 NOTE — TELEPHONE ENCOUNTER
Patient contacted. Patient reviewed Aldactone instructions, states is taking 50 mg one tablet two times daily as per last visit note. Rx went to [facility]. Patient is out of prescriptions and would like one month locally so she can resume. Patient has been out of 3 days. It appears when sent to [id number] tablets for the 50 mg tablet were given and the instructions were read as 50 mg BID.    Patient is taking  Aldactone 50 mg BID and is requesting refill at [facility], again she is out of this medications.

## 2025-01-16 NOTE — TELEPHONE ENCOUNTER
Spoke with patient. [place] [facility] [place].

## 2025-01-24 DIAGNOSIS — I10 BENIGN ESSENTIAL HYPERTENSION: ICD-10-CM

## 2025-01-24 DIAGNOSIS — E11.9 TYPE 2 DIABETES MELLITUS WITHOUT COMPLICATION, WITHOUT LONG-TERM CURRENT USE OF INSULIN (MULTI): ICD-10-CM

## 2025-01-24 DIAGNOSIS — E78.2 MIXED HYPERLIPIDEMIA: ICD-10-CM

## 2025-01-24 DIAGNOSIS — G47.30 SLEEP APNEA IN ADULT: ICD-10-CM

## 2025-01-27 RX ORDER — ATORVASTATIN CALCIUM 20 MG/1
20 TABLET, FILM COATED ORAL NIGHTLY
Qty: 90 TABLET | Refills: 2 | Status: SHIPPED | OUTPATIENT
Start: 2025-01-27 | End: 2026-01-27

## 2025-01-27 RX ORDER — CARVEDILOL 25 MG/1
37.5 TABLET ORAL 2 TIMES DAILY
Qty: 270 TABLET | Refills: 2 | Status: SHIPPED | OUTPATIENT
Start: 2025-01-27 | End: 2026-01-27

## 2025-01-31 ENCOUNTER — TELEPHONE (OUTPATIENT)
Dept: CARDIOLOGY | Facility: CLINIC | Age: 50
End: 2025-01-31
Payer: COMMERCIAL

## 2025-01-31 NOTE — TELEPHONE ENCOUNTER
Call from patient about spironolactone dose, there were two different doses in med list, and she has two different bottles.  Per last OV, it was increased to 50mg BID, which is what she had most recently filled, and has been taking.  Pt verbalized understanding, and repeated correct dose.    Med list updated.

## 2025-05-14 ENCOUNTER — APPOINTMENT (OUTPATIENT)
Dept: CARDIOLOGY | Facility: CLINIC | Age: 50
End: 2025-05-14

## 2025-05-15 ENCOUNTER — APPOINTMENT (OUTPATIENT)
Dept: CARDIOLOGY | Facility: CLINIC | Age: 50
End: 2025-05-15
Payer: COMMERCIAL

## 2025-05-21 ENCOUNTER — APPOINTMENT (OUTPATIENT)
Dept: CARDIOLOGY | Facility: CLINIC | Age: 50
End: 2025-05-21
Payer: COMMERCIAL

## 2025-05-21 VITALS
BODY MASS INDEX: 48.82 KG/M2 | DIASTOLIC BLOOD PRESSURE: 94 MMHG | WEIGHT: 293 LBS | HEART RATE: 72 BPM | HEIGHT: 65 IN | SYSTOLIC BLOOD PRESSURE: 162 MMHG

## 2025-05-21 DIAGNOSIS — I10 BENIGN ESSENTIAL HYPERTENSION: ICD-10-CM

## 2025-05-21 DIAGNOSIS — I10 UNCONTROLLED HYPERTENSION: Primary | ICD-10-CM

## 2025-05-21 DIAGNOSIS — I89.0 LYMPHEDEMA, NOT ELSEWHERE CLASSIFIED: ICD-10-CM

## 2025-05-21 PROBLEM — E11.9 DIABETES (MULTI): Status: RESOLVED | Noted: 2023-10-24 | Resolved: 2025-05-21

## 2025-05-21 PROCEDURE — 3077F SYST BP >= 140 MM HG: CPT | Performed by: NURSE PRACTITIONER

## 2025-05-21 PROCEDURE — 99213 OFFICE O/P EST LOW 20 MIN: CPT | Performed by: NURSE PRACTITIONER

## 2025-05-21 PROCEDURE — 3008F BODY MASS INDEX DOCD: CPT | Performed by: NURSE PRACTITIONER

## 2025-05-21 PROCEDURE — 3080F DIAST BP >= 90 MM HG: CPT | Performed by: NURSE PRACTITIONER

## 2025-05-21 RX ORDER — CHLORTHALIDONE 25 MG/1
25 TABLET ORAL DAILY
Qty: 30 TABLET | Refills: 3 | Status: SHIPPED | OUTPATIENT
Start: 2025-05-21 | End: 2025-06-20

## 2025-05-21 RX ORDER — CHLORTHALIDONE 25 MG/1
25 TABLET ORAL DAILY
Qty: 30 TABLET | Refills: 5 | Status: SHIPPED | OUTPATIENT
Start: 2025-05-21 | End: 2025-05-21 | Stop reason: SDUPTHER

## 2025-05-21 NOTE — LETTER
"May 22, 2025     Anais Arroyo, APRN-CNP  [address] [place] [address]    Patient: Sanjuana Angel   YOB: 1975   Date of Visit: 5/21/2025       Dear Dr. Anais Arroyo, APRN-CNP:    Thank you for referring Sanjuana Angel to me for evaluation. Below are my notes for this consultation.  If you have questions, please do not hesitate to call me. I look forward to following your patient along with you.       Sincerely,     Teri Jaramillo APRN-CNP      CC: No Recipients  ______________________________________________________________________________________    Subjective:   Sanjuana Angel is a 49 y.o. female with hypertension.  Presents to the office ambulatory steady gait.  Last evaluated in clinic Dr. Martines November 2024.    She is currently under evaluation with [facility] bariatric team; has pending sleep study.  Previously was under care of lymphedema clinic but has not gone in a number of months.  Presents today with increasing lymphedema MR refer back for treatment as it was beneficial in the past.    Otherwise is noted to have accelerated hypertension here in the office.  On record review somehow she is off of chlorthalidone.  When I last saw her in spring 2024 blood pressure was optimal on chlorthalidone and current doses of carvedil & spironolactone;  Avapro dose was 150mg.    May 2025 labs show potassium 4.1, creatinine 0.8.     Hypertension ROS: taking medications as instructed, no medication side effects noted, no TIA's, no chest pain on exertion, no dyspnea on exertion, and no swelling of ankles.   New concerns: Weight gain, progressive lymphedema..     Objective:   BP (!) 162/94 (BP Location: Right arm, Patient Position: Sitting)   Pulse 72   Ht 1.651 m (5' 5\")   Wt (!) 202 kg (446 lb)   BMI 74.22 kg/m²    Appearance alert, well appearing, and in no distress.  General exam BP noted to be moderately elevated today in office, S1, S2 normal, no gallop, no murmur, chest clear, no JVD, no HSM, " no edema.   Lab review: labs are reviewed, up to date and normal.     Assessment:    Hypertension poorly controlled and needs improvement.     Plan:   Reviewed diet, exercise and weight control.  Recommended sodium restriction.    Through informed decision making process incorporating patients unique circumstances, the following treatment plan will be initiated:    1.  Prescription drug management of cardiovascular medication for efficacy, adherence to treatment, side effect assessment and polypharmacy. Current treatment clinically warranted and to continue with following modifications:    - Begin chlorthalidone 25mg daily     2. Referral to lymphedema clinic    3.  Labs (chem6) in one week    4. Return for follow-up; in the interim, contact the office if new symptoms arise.  NP 3 weeks     Teri Jaramillo MSN, APRN-CNP, PMHNP-[facility] [place]  [place], [place]     Please excuse any errors in grammar or translation related to this dictation. Voice recognition software was utilized to prepare this document.

## 2025-05-21 NOTE — PATIENT INSTRUCTIONS
Please bring all medicines, vitamins, and herbal supplements with you when you come to the office.    Prescriptions will not be filled unless you are compliant with your follow up appointments or have a follow up appointment scheduled as per instruction of your physician. Refills should be requested at the time of your visit.     PLAN:   Through informed decision making process incorporating patients unique circumstances, the following treatment plan will be initiated:    1.  Prescription drug management of cardiovascular medication for efficacy, adherence to treatment, side effect assessment and polypharmacy. Current treatment clinically warranted and to continue with following modifications:    - Begin chlorthalidone 25mg daily     2. Referral to lymphedema clinic    3.  Labs (chem6) in one week    4. Return for follow-up; in the interim, contact the office if new symptoms arise.  NP 3 weeks

## 2025-05-22 NOTE — PROGRESS NOTES
"Subjective:   Sanjuana Angel is a 49 y.o. female with hypertension.  Presents to the office ambulatory steady gait.  Last evaluated in clinic Dr. Martines November 2024.    She is currently under evaluation with [facility] bariatric team; has pending sleep study.  Previously was under care of lymphedema clinic but has not gone in a number of months.  Presents today with increasing lymphedema MR refer back for treatment as it was beneficial in the past.    Otherwise is noted to have accelerated hypertension here in the office.  On record review somehow she is off of chlorthalidone.  When I last saw her in spring 2024 blood pressure was optimal on chlorthalidone and current doses of carvedil & spironolactone;  Avapro dose was 150mg.    May 2025 labs show potassium 4.1, creatinine 0.8.     Hypertension ROS: taking medications as instructed, no medication side effects noted, no TIA's, no chest pain on exertion, no dyspnea on exertion, and no swelling of ankles.   New concerns: Weight gain, progressive lymphedema..     Objective:   BP (!) 162/94 (BP Location: Right arm, Patient Position: Sitting)   Pulse 72   Ht 1.651 m (5' 5\")   Wt (!) 202 kg (446 lb)   BMI 74.22 kg/m²    Appearance alert, well appearing, and in no distress.  General exam BP noted to be moderately elevated today in office, S1, S2 normal, no gallop, no murmur, chest clear, no JVD, no HSM, no edema.   Lab review: labs are reviewed, up to date and normal.     Assessment:    Hypertension poorly controlled and needs improvement.     Plan:   Reviewed diet, exercise and weight control.  Recommended sodium restriction.    Through informed decision making process incorporating patients unique circumstances, the following treatment plan will be initiated:    1.  Prescription drug management of cardiovascular medication for efficacy, adherence to treatment, side effect assessment and polypharmacy. Current treatment clinically warranted and to continue with following " modifications:    - Begin chlorthalidone 25mg daily     2. Referral to lymphedema clinic    3.  Labs (chem6) in one week    4. Return for follow-up; in the interim, contact the office if new symptoms arise.  NP 3 weeks     Teri Jaramillo MSN, APRN-CNP, PMHNP-[facility] [place], [place]     Please excuse any errors in grammar or translation related to this dictation. Voice recognition software was utilized to prepare this document.

## 2025-06-11 ENCOUNTER — APPOINTMENT (OUTPATIENT)
Dept: CARDIOLOGY | Facility: CLINIC | Age: 50
End: 2025-06-11
Payer: COMMERCIAL

## 2025-06-13 ENCOUNTER — TELEPHONE (OUTPATIENT)
Dept: CARDIOLOGY | Facility: CLINIC | Age: 50
End: 2025-06-13
Payer: COMMERCIAL

## 2025-06-13 NOTE — TELEPHONE ENCOUNTER
Pt leaves message stating that she was to be seen in office a few days ago but cancelled her appointment. Chlorthalidone was added back in May and she was to get labs a week after. Pt inquiring if she should still get labs as she has not already done so.    Attempted to phone pt back. Unable to reach. Left message to return call and advised that she should have labs done is not already.

## 2025-06-18 ENCOUNTER — APPOINTMENT (OUTPATIENT)
Dept: CARDIOLOGY | Facility: CLINIC | Age: 50
End: 2025-06-18
Payer: COMMERCIAL

## 2025-07-01 ENCOUNTER — APPOINTMENT (OUTPATIENT)
Dept: CARDIOLOGY | Facility: CLINIC | Age: 50
End: 2025-07-01
Payer: COMMERCIAL

## 2025-07-21 ENCOUNTER — TELEPHONE (OUTPATIENT)
Dept: CARDIOLOGY | Facility: CLINIC | Age: 50
End: 2025-07-21
Payer: COMMERCIAL

## 2025-07-21 NOTE — TELEPHONE ENCOUNTER
[facility] office called stating that patient has been non compliant, not showing for appts.  Due to this they have decided to discharge patient from the practice.

## 2025-08-13 ENCOUNTER — APPOINTMENT (OUTPATIENT)
Dept: CARDIOLOGY | Facility: CLINIC | Age: 50
End: 2025-08-13
Payer: COMMERCIAL

## 2025-08-20 ENCOUNTER — APPOINTMENT (OUTPATIENT)
Dept: CARDIOLOGY | Facility: CLINIC | Age: 50
End: 2025-08-20
Payer: COMMERCIAL

## 2025-09-05 ENCOUNTER — APPOINTMENT (OUTPATIENT)
Dept: CARDIOLOGY | Facility: CLINIC | Age: 50
End: 2025-09-05
Payer: COMMERCIAL

## 2025-09-15 ENCOUNTER — APPOINTMENT (OUTPATIENT)
Dept: CARDIOLOGY | Facility: CLINIC | Age: 50
End: 2025-09-15
Payer: COMMERCIAL